# Patient Record
Sex: MALE | Race: WHITE | NOT HISPANIC OR LATINO | Employment: FULL TIME | URBAN - METROPOLITAN AREA
[De-identification: names, ages, dates, MRNs, and addresses within clinical notes are randomized per-mention and may not be internally consistent; named-entity substitution may affect disease eponyms.]

---

## 2020-10-23 ENCOUNTER — OFFICE VISIT (OUTPATIENT)
Dept: FAMILY MEDICINE | Facility: CLINIC | Age: 25
End: 2020-10-23
Payer: COMMERCIAL

## 2020-10-23 VITALS
BODY MASS INDEX: 23.95 KG/M2 | HEART RATE: 68 BPM | HEIGHT: 66 IN | TEMPERATURE: 98.6 F | DIASTOLIC BLOOD PRESSURE: 72 MMHG | SYSTOLIC BLOOD PRESSURE: 136 MMHG | WEIGHT: 149 LBS | OXYGEN SATURATION: 100 %

## 2020-10-23 DIAGNOSIS — R19.00 ABDOMINAL WALL BULGE: Primary | ICD-10-CM

## 2020-10-23 PROCEDURE — 99203 OFFICE O/P NEW LOW 30 MIN: CPT | Performed by: PHYSICIAN ASSISTANT

## 2020-10-23 ASSESSMENT — MIFFLIN-ST. JEOR: SCORE: 1600.67

## 2020-10-23 ASSESSMENT — ENCOUNTER SYMPTOMS
SHORTNESS OF BREATH: 0
ABDOMINAL PAIN: 0
NERVOUS/ANXIOUS: 0
VOMITING: 0
DIARRHEA: 0
FEVER: 0
LIGHT-HEADEDNESS: 0

## 2020-10-23 NOTE — PATIENT INSTRUCTIONS
Good news, the area of concern is actually a muscle in your abdominal wall and does not a hernia.  Please call the number on your paperwork to schedule an appointment for your yearly physical with one of our primary care doctors.  Please reach out with any questions or concerns.

## 2020-10-23 NOTE — PROGRESS NOTES
"Subjective     Geovany Capone is a 25 year old male who presents to clinic today for the following health issues:    HPI            Chief Complaint   Patient presents with     Mass     2.5 week noticed budlge in upper groin/lower abdomin area.      Patient noticed a bulge in the RLQ/groin that is only noticeable with flexing abdominal muscles. No specific injury or event that started symptoms. No pain.  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    Patient notes hernias as a child that were fixed surgically.       Review of Systems   Constitutional: Negative for fever.   HENT: Negative for congestion.    Respiratory: Negative for shortness of breath.    Cardiovascular: Negative for chest pain.   Gastrointestinal: Negative for abdominal pain, diarrhea and vomiting.   Skin: Negative for rash.        RLQ/Groin bulge   Neurological: Negative for light-headedness.   Psychiatric/Behavioral: The patient is not nervous/anxious.             Objective    /72   Pulse 68   Temp 98.6  F (37  C) (Tympanic)   Ht 1.664 m (5' 5.5\")   Wt 67.6 kg (149 lb)   SpO2 100%   BMI 24.42 kg/m    Body mass index is 24.42 kg/m .  Physical Exam  Vitals signs and nursing note reviewed.   Constitutional:       General: He is not in acute distress.     Appearance: Normal appearance.   HENT:      Head: Normocephalic and atraumatic.   Eyes:      Extraocular Movements: Extraocular movements intact.      Pupils: Pupils are equal, round, and reactive to light.   Neck:      Musculoskeletal: Normal range of motion.   Cardiovascular:      Rate and Rhythm: Normal rate and regular rhythm.      Heart sounds: Normal heart sounds.   Pulmonary:      Effort: Pulmonary effort is normal.      Breath sounds: Normal breath sounds.   Abdominal:      General: Bowel sounds are normal. There is no distension.      Palpations: Abdomen is soft. There is no mass.      Tenderness: There is no abdominal tenderness. There is no guarding or " rebound.      Comments: Area of concern is ridge formed by external oblique musculature   Musculoskeletal: Normal range of motion.   Skin:     General: Skin is warm and dry.   Neurological:      General: No focal deficit present.      Mental Status: He is alert.   Psychiatric:         Mood and Affect: Mood normal.         Behavior: Behavior normal.                  Assessment & Plan     Abdominal wall bulge  Patient is a 25-year-old male who presents to clinic due to concerns for right lower quadrant abdominal wall bulging.  Vital signs normal.  Physical exam without acute abnormalities.  Area of concern is ridge formed by external oblique musculature.  There is no palpable mass or tenderness to palpation to suggest hernia or acute abdomen.     Recommended establishing primary care for annual exams.  Patient agreeable.          See Patient Instructions    Return in about 2 weeks (around 11/6/2020) for Physical Exam.    Sheela Desir PA-C  [facility]

## 2021-08-02 NOTE — PROGRESS NOTES
"    Assessment & Plan     Molluscum contagiosum  Of suprapubic region.  Please see procedure note.   - DESTRUCT BENIGN LESION, UP TO 14     See Patient Instructions    Return in about 1 week (around 8/10/2021), or if symptoms worsen or fail to improve.    Lion Ware DO  [facility]    There are no problems to display for this patient.        Annmarie Armenta is a 25 year old who presents for the following health issues   *declines Tdap    HPI     Red, lump in groin area x3.5 months.     1. Bumps on groin region: Ongoing for the past 2 months.  On pubic region.  Patient does shave.  Not painful.  Not itchy.  No suspicious lesions on penis or scrotum.  Patient denies any unprotected sex but does admit to have oral sex.     Review of Systems   Constitutional: Negative for chills and fever.   HENT: Negative for congestion, ear pain, hearing loss and sore throat.    Eyes: Negative for pain and visual disturbance.   Respiratory: Negative for cough and shortness of breath.    Cardiovascular: Negative for chest pain, palpitations and peripheral edema.   Gastrointestinal: Negative for abdominal pain, constipation, diarrhea, heartburn, hematochezia and nausea.   Genitourinary: Negative for discharge, dysuria, frequency, genital sores, hematuria, impotence and urgency.   Musculoskeletal: Negative for arthralgias, joint swelling and myalgias.   Skin: Negative for rash.        Bumps on pubic region   Neurological: Negative for dizziness, weakness, headaches and paresthesias.   Psychiatric/Behavioral: Negative for mood changes. The patient is not nervous/anxious.          Objective    /73 (BP Location: Right arm, Cuff Size: Adult Large)   Pulse 71   Temp 98.6  F (37  C) (Tympanic)   Ht 1.803 m (5' 11\")   Wt 69.1 kg (152 lb 6.4 oz)   SpO2 100%   BMI 21.26 kg/m    Body mass index is 21.26 kg/m .  Physical Exam  Constitutional:       General: He is not in acute distress.     Appearance: He " is well-developed.   HENT:      Head: Normocephalic and atraumatic.      Nose: Nose normal.   Eyes:      Conjunctiva/sclera: Conjunctivae normal.   Neck:      Trachea: No tracheal deviation.   Cardiovascular:      Rate and Rhythm: Normal rate and regular rhythm.      Heart sounds: Normal heart sounds.   Pulmonary:      Effort: Pulmonary effort is normal.      Breath sounds: No wheezing.   Genitourinary:     Comments: Total of three slightly raised pedunculated papules consistent with papilloma.   Musculoskeletal:         General: Normal range of motion.      Cervical back: Normal range of motion.   Skin:     Findings: No erythema or rash.   Neurological:      Mental Status: He is alert and oriented to person, place, and time.   Psychiatric:         Behavior: Behavior normal.     Risk and benefits were explained to patient.  Patient would like to proceed.  Liquid nitrogen was applied to papilloma on pubic region with a total of 3 treatments.

## 2021-08-03 ENCOUNTER — OFFICE VISIT (OUTPATIENT)
Dept: FAMILY MEDICINE | Facility: CLINIC | Age: 26
End: 2021-08-03
Payer: COMMERCIAL

## 2021-08-03 VITALS
HEIGHT: 71 IN | HEART RATE: 71 BPM | WEIGHT: 152.4 LBS | OXYGEN SATURATION: 100 % | TEMPERATURE: 98.6 F | DIASTOLIC BLOOD PRESSURE: 73 MMHG | BODY MASS INDEX: 21.34 KG/M2 | SYSTOLIC BLOOD PRESSURE: 120 MMHG

## 2021-08-03 DIAGNOSIS — B08.1 MOLLUSCUM CONTAGIOSUM: Primary | ICD-10-CM

## 2021-08-03 PROCEDURE — 17110 DESTRUCTION B9 LES UP TO 14: CPT | Performed by: FAMILY MEDICINE

## 2021-08-03 ASSESSMENT — MIFFLIN-ST. JEOR: SCORE: 1703.41

## 2021-08-03 NOTE — PATIENT INSTRUCTIONS
Herman Armenta,    Thank you for allowing [facility] to manage your care.    If you have any questions or concerns, please feel free to call us at [phone].    Sincerely,    Dr. Ware    Did you know?      You can schedule a video visit for follow-up appointments as well as future appointments for certain conditions.  Please see the below link.     [email or web address]    If you have not already done so,  I encourage you to sign up for VoltDBhart ([email or web address]).  This will allow you to review your results, securely communicate with a provider, and schedule virtual visits as well.        Patient Education     Molluscum Contagiosum (Adult)  Molluscum contagiosum is a common skin infection. It is caused by a pox virus. The infection results in raised, flesh-colored bumps with central indentations on the skin. The bumps are sometimes itchy, but not painful. They may spread or form lines when scratched. Almost any area of skin can be affected. Common sites include the face, neck, armpit, arms, hands, and genitals.    Molluscum contagiosum spreads easily from one part of the body to another. It spreads through scratching or other contact. It can also spread from person to person. This often happens through shared clothing, towels, or objects such as shared sports gear. It can spread during contact sports or sexual contact.  Because it is caused by a virus, antibiotics don't help. The infection usually goes away on its own within a period of 6 to 18 months, but may spread if not treated. The infection may continue in people with a weak immune system. This includes people with diabetes, cancer, or HIV.  If the bumps are bothersome or unsightly, treatment may remove them. This may include scraping, freezing, or by applying an acid, blistering solution, or a cream that modulates the immune system.  Home care  Your healthcare provider can prescribe a medicine  to help the bumps heal. Follow the provider s instructions for using these medicines.    The following are general care guidelines:    Don't scratch the rash. Scratching spreads the infection. If needed, cover affected skin with bandages to help prevent scratching.    Wash your hands before and after caring for the rash.    Don't share towels, washcloths, or clothing with anyone.    Don't shave any areas where the bumps are present.    Don't have sex if the bumps are in the genital area.    If participating in contact sports or other activity that involves skin-to-skin contact, cover all affected skin with clothing or bandages.    Don't swim in public pools until the rash clears.  Follow-up care  Follow up with your healthcare provider, or as advised.  When to seek medical advice  Call your healthcare provider right away if any of these occur:    Fever of 100.4 F (38 C) or higher    Signs of infection, such as warmth, pain, oozing, or redness    Bumps appear on a new area of the body or seem to be spreading rapidly  StayWell last reviewed this educational content on 6/1/2018 2000-2021 [employer]. All rights reserved. This information is not intended as a substitute for professional medical care. Always follow your healthcare professional's instructions.

## 2021-08-04 ASSESSMENT — ENCOUNTER SYMPTOMS
DIZZINESS: 0
EYE PAIN: 0
DYSURIA: 0
FREQUENCY: 0
PALPITATIONS: 0
CHILLS: 0
SHORTNESS OF BREATH: 0
SORE THROAT: 0
ABDOMINAL PAIN: 0
WEAKNESS: 0
FEVER: 0
ARTHRALGIAS: 0
DIARRHEA: 0
HEADACHES: 0
NAUSEA: 0
NERVOUS/ANXIOUS: 0
HEMATURIA: 0
HEMATOCHEZIA: 0
COUGH: 0
CONSTIPATION: 0
JOINT SWELLING: 0
PARESTHESIAS: 0
MYALGIAS: 0
HEARTBURN: 0

## 2021-10-25 NOTE — PROGRESS NOTES
"  Assessment & Plan     1. Other viral warts  Please see procedure note.   - REVIEW OF HEALTH MAINTENANCE PROTOCOL ORDERS  - DESTRUCT BENIGN LESION, UP TO 14     See Patient Instructions    Return in about 6 months (around 4/26/2022), or if symptoms worsen or fail to improve, for with me, in person, Physical Exam.    DO ROBERTO Doe [facility] RADHA Armenta is a 26 year old who presents for the following health issues     Declines flu and Tdap vaccine    HPI     Molluscum groin area    1. Molluscum f/u: States that symptoms has improved.  However, there is one more lesion on testicle.  Patient is interested in liquid nitrogen.       Review of Systems   Constitutional: Negative for chills and fever.   HENT: Negative for congestion, ear pain, hearing loss and sore throat.    Eyes: Negative for pain and visual disturbance.   Respiratory: Negative for cough and shortness of breath.    Cardiovascular: Negative for chest pain, palpitations and peripheral edema.   Gastrointestinal: Negative for abdominal pain, constipation, diarrhea, heartburn, hematochezia and nausea.   Genitourinary: Negative for discharge, dysuria, frequency, genital sores, hematuria, impotence and urgency.   Musculoskeletal: Negative for arthralgias, joint swelling and myalgias.   Skin: Negative for rash.        Wart on left testicle   Neurological: Negative for dizziness, weakness, headaches and paresthesias.   Psychiatric/Behavioral: Negative for mood changes. The patient is not nervous/anxious.          Objective    /75 (BP Location: Right arm, Cuff Size: Adult Regular)   Pulse 65   Temp 97.5  F (36.4  C) (Tympanic)   Ht 1.803 m (5' 11\")   Wt 68.9 kg (152 lb)   SpO2 100%   BMI 21.20 kg/m    Body mass index is 21.2 kg/m .  Physical Exam  Constitutional:       General: He is not in acute distress.     Appearance: He is well-developed.   HENT:      Head: Normocephalic and atraumatic.      Nose: Nose " normal.   Eyes:      Conjunctiva/sclera: Conjunctivae normal.   Neck:      Trachea: No tracheal deviation.   Cardiovascular:      Rate and Rhythm: Normal rate and regular rhythm.      Heart sounds: Normal heart sounds.   Pulmonary:      Effort: Pulmonary effort is normal.      Breath sounds: No wheezing.   Musculoskeletal:         General: Normal range of motion.      Cervical back: Normal range of motion.   Skin:     Findings: No erythema or rash.      Comments: Small verrucous lesion involving left upper testicle   Neurological:      Mental Status: He is alert and oriented to person, place, and time.   Psychiatric:         Behavior: Behavior normal.          Risk and benefits were explained to patient.  Patient would like to proceed.  Liquid nitrogen was applied to wart with a total of 3 treatments.

## 2021-10-26 ENCOUNTER — OFFICE VISIT (OUTPATIENT)
Dept: FAMILY MEDICINE | Facility: CLINIC | Age: 26
End: 2021-10-26
Payer: COMMERCIAL

## 2021-10-26 VITALS
OXYGEN SATURATION: 100 % | HEIGHT: 71 IN | DIASTOLIC BLOOD PRESSURE: 75 MMHG | BODY MASS INDEX: 21.28 KG/M2 | SYSTOLIC BLOOD PRESSURE: 120 MMHG | TEMPERATURE: 97.5 F | WEIGHT: 152 LBS | HEART RATE: 65 BPM

## 2021-10-26 DIAGNOSIS — B07.8 OTHER VIRAL WARTS: Primary | ICD-10-CM

## 2021-10-26 PROCEDURE — 17110 DESTRUCTION B9 LES UP TO 14: CPT | Performed by: FAMILY MEDICINE

## 2021-10-26 ASSESSMENT — ENCOUNTER SYMPTOMS
FEVER: 0
ARTHRALGIAS: 0
DYSURIA: 0
FREQUENCY: 0
PALPITATIONS: 0
ABDOMINAL PAIN: 0
CONSTIPATION: 0
HEADACHES: 0
CHILLS: 0
JOINT SWELLING: 0
EYE PAIN: 0
SHORTNESS OF BREATH: 0
SORE THROAT: 0
HEMATURIA: 0
MYALGIAS: 0
NERVOUS/ANXIOUS: 0
COUGH: 0
PARESTHESIAS: 0
WEAKNESS: 0
NAUSEA: 0
HEARTBURN: 0
DIARRHEA: 0
HEMATOCHEZIA: 0
DIZZINESS: 0

## 2021-10-26 ASSESSMENT — PAIN SCALES - GENERAL: PAINLEVEL: NO PAIN (0)

## 2021-10-26 ASSESSMENT — MIFFLIN-ST. JEOR: SCORE: 1696.6

## 2021-10-26 NOTE — PATIENT INSTRUCTIONS
Anshu Armenta,    Thank you for allowing [facility] to manage your care.    If you have any questions or concerns, please feel free to call us at [phone].    Sincerely,    Dr. Ware    Did you know?      You can schedule a video visit for follow-up appointments as well as future appointments for certain conditions.  Please see the below link.     [email or web address]    If you have not already done so,  I encourage you to sign up for A LITTLE WORLDt ([email or web address]).  This will allow you to review your results, securely communicate with a provider, and schedule virtual visits as well.

## 2023-02-16 ENCOUNTER — OFFICE VISIT (OUTPATIENT)
Dept: FAMILY MEDICINE | Facility: CLINIC | Age: 28
End: 2023-02-16
Payer: COMMERCIAL

## 2023-02-16 VITALS
WEIGHT: 152.6 LBS | SYSTOLIC BLOOD PRESSURE: 130 MMHG | BODY MASS INDEX: 20.22 KG/M2 | TEMPERATURE: 99.5 F | RESPIRATION RATE: 16 BRPM | DIASTOLIC BLOOD PRESSURE: 64 MMHG | HEART RATE: 71 BPM | OXYGEN SATURATION: 99 % | HEIGHT: 73 IN

## 2023-02-16 DIAGNOSIS — Z71.1 FEARED COMPLAINT WITHOUT DIAGNOSIS: Primary | ICD-10-CM

## 2023-02-16 DIAGNOSIS — Z11.3 SCREEN FOR STD (SEXUALLY TRANSMITTED DISEASE): ICD-10-CM

## 2023-02-16 PROCEDURE — 87591 N.GONORRHOEAE DNA AMP PROB: CPT | Performed by: FAMILY MEDICINE

## 2023-02-16 PROCEDURE — 99213 OFFICE O/P EST LOW 20 MIN: CPT | Performed by: FAMILY MEDICINE

## 2023-02-16 PROCEDURE — 87491 CHLMYD TRACH DNA AMP PROBE: CPT | Performed by: FAMILY MEDICINE

## 2023-02-16 ASSESSMENT — PAIN SCALES - GENERAL: PAINLEVEL: NO PAIN (0)

## 2023-02-16 NOTE — PROGRESS NOTES
1. Feared complaint without diagnosis  Patient reassurance.  No evidence of penile or scrotal mass.     2. Screen for STD (sexually transmitted disease)  Stressed the importance of safe sexual pratices  - NEISSERIA GONORRHOEA PCR; Future  - CHLAMYDIA TRACHOMATIS PCR; Future  - CHLAMYDIA TRACHOMATIS PCR  - NEISSERIA GONORRHOEA PCR      Annmarie Armenta is a 27 year old, presenting for the following health issues:  Consult    Patient states he has had a lump in his groin x7 months, no pain.     History of Present Illness       Reason for visit:  Std?  Symptom onset:  More than a month  Symptoms include:  Looks like chlamydia? no itchiness or burning  Symptom intensity:  Mild  Symptom progression:  Staying the same  Had these symptoms before:  No  What makes it worse:  No  What makes it better:  Nothing specifically    He eats 0-1 servings of fruits and vegetables daily.He consumes 1 sweetened beverage(s) daily.He exercises with enough effort to increase his heart rate 60 or more minutes per day.  He exercises with enough effort to increase his heart rate 5 days per week.   He is taking medications regularly.     1. STI concerns: Concerned of chlamydia.  Looks like a blob.  Noticed on scrotum.  Noticed 7 months.  No pain.  No changes in size.  States of unprotected sex.  Has not had unprotected sex since 2021.     Review of Systems   Constitutional: Negative for chills and fever.   HENT: Negative for congestion, ear pain, hearing loss and sore throat.    Respiratory: Negative for cough and shortness of breath.    Cardiovascular: Negative for chest pain, palpitations and peripheral edema.   Genitourinary:        States of scrotum lesion   Musculoskeletal: Negative for arthralgias, joint swelling and myalgias.   Skin: Negative for rash.   Neurological: Negative for dizziness, weakness, headaches and paresthesias.   Psychiatric/Behavioral: Negative for mood changes. The patient is not nervous/anxious.            "  Objective    /64   Pulse 71   Temp 99.5  F (37.5  C) (Temporal)   Resp 16   Ht 1.846 m (6' 0.68\")   Wt 69.2 kg (152 lb 9.6 oz)   SpO2 99%   BMI 20.31 kg/m    Body mass index is 20.31 kg/m .  Physical Exam  Constitutional:       General: He is not in acute distress.     Appearance: He is well-developed.   HENT:      Head: Normocephalic and atraumatic.      Nose: Nose normal.   Eyes:      Conjunctiva/sclera: Conjunctivae normal.   Neck:      Trachea: No tracheal deviation.   Cardiovascular:      Rate and Rhythm: Normal rate and regular rhythm.      Heart sounds: Normal heart sounds.   Pulmonary:      Effort: Pulmonary effort is normal.      Breath sounds: No wheezing.   Genitourinary:     Comments: Normal scrotum and penis.  Negative for any suspicious lesoins  Musculoskeletal:         General: Normal range of motion.      Cervical back: Normal range of motion.   Skin:     Findings: No erythema or rash.   Neurological:      Mental Status: He is alert and oriented to person, place, and time.   Psychiatric:         Behavior: Behavior normal.           "

## 2023-02-16 NOTE — LETTER
February 16, 2023      Geovany Capone  [address]  [place] [address]        To Whom It May Concern:    Geovany Capone was seen in our clinic. Please excuse from work this afternoon due to a doctor's appointment.      Sincerely,      Dr. Lion Ware

## 2023-02-16 NOTE — PATIENT INSTRUCTIONS
Anshu Armenta,    Thank you for allowing [facility] to manage your care.    I ordered a urine test, please go to the laboratory to get your laboratory studies.    For your convenience, test results are released as soon as they are available  Please allow 1-2 business days for me to send you a comment about your results.  If not done so, I encourage you to login into [email or web address] to review your results in real time.     If you have any questions or concerns, please feel free to call us at [phone].    Sincerely,    Dr. Ware    Did you know?      You can schedule a video visit for follow-up appointments as well as future appointments for certain conditions.  Please see the below link.     [email or web address]    If you have not already done so,  I encourage you to sign up for Public Solutiont ([email or web address]).  This will allow you to review your results, securely communicate with a provider, and schedule virtual visits as well.

## 2023-02-17 LAB
C TRACH DNA SPEC QL NAA+PROBE: NEGATIVE
N GONORRHOEA DNA SPEC QL NAA+PROBE: NEGATIVE

## 2023-02-17 ASSESSMENT — ENCOUNTER SYMPTOMS
PARESTHESIAS: 0
PALPITATIONS: 0
MYALGIAS: 0
COUGH: 0
HEADACHES: 0
SORE THROAT: 0
JOINT SWELLING: 0
NERVOUS/ANXIOUS: 0
ARTHRALGIAS: 0
SHORTNESS OF BREATH: 0
CHILLS: 0
FEVER: 0
WEAKNESS: 0
DIZZINESS: 0

## 2023-07-28 ENCOUNTER — ANCILLARY PROCEDURE (OUTPATIENT)
Dept: GENERAL RADIOLOGY | Facility: CLINIC | Age: 28
End: 2023-07-28
Attending: PHYSICIAN ASSISTANT
Payer: COMMERCIAL

## 2023-07-28 ENCOUNTER — OFFICE VISIT (OUTPATIENT)
Dept: URGENT CARE | Facility: URGENT CARE | Age: 28
End: 2023-07-28

## 2023-07-28 VITALS
WEIGHT: 151.8 LBS | OXYGEN SATURATION: 100 % | DIASTOLIC BLOOD PRESSURE: 73 MMHG | HEART RATE: 61 BPM | TEMPERATURE: 98.6 F | RESPIRATION RATE: 12 BRPM | SYSTOLIC BLOOD PRESSURE: 123 MMHG | BODY MASS INDEX: 20.21 KG/M2

## 2023-07-28 DIAGNOSIS — M25.512 ACUTE PAIN OF LEFT SHOULDER: ICD-10-CM
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
## 2023-07-28 DIAGNOSIS — S42.022A DISPLACED FRACTURE OF SHAFT OF LEFT CLAVICLE, INITIAL ENCOUNTER FOR CLOSED FRACTURE: Primary | ICD-10-CM

## 2023-07-28 PROCEDURE — 99213 OFFICE O/P EST LOW 20 MIN: CPT | Performed by: PHYSICIAN ASSISTANT

## 2023-07-28 PROCEDURE — 73030 X-RAY EXAM OF SHOULDER: CPT | Mod: TC | Performed by: RADIOLOGY

## 2023-07-28 PROCEDURE — 73000 X-RAY EXAM OF COLLAR BONE: CPT | Mod: TC | Performed by: RADIOLOGY
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
## 2023-07-31 NOTE — TELEPHONE ENCOUNTER
DIAGNOSIS: clavicle fracture   APPOINTMENT DATE: 08/04/2023   NOTES STATUS DETAILS   OFFICE NOTE from referring provider Internal 07/28/2023 Dr Daigle [facility] urgent care   OFFICE NOTE from other specialist N/A    DISCHARGE SUMMARY from hospital N/A    DISCHARGE REPORT from the ER N/A    OPERATIVE REPORT N/A    EMG report N/A    MEDICATION LIST N/A    MRI N/A    DEXA (osteoporosis/bone health) N/A    CT SCAN N/A    XRAYS (IMAGES & REPORTS) Internal 07/28/2023 [facility] shoulder, clavicle

## 2023-08-04 ENCOUNTER — OFFICE VISIT (OUTPATIENT)
Dept: ORTHOPEDICS | Facility: CLINIC | Age: 28
End: 2023-08-04
Payer: COMMERCIAL

## 2023-08-04 ENCOUNTER — ANCILLARY PROCEDURE (OUTPATIENT)
Dept: GENERAL RADIOLOGY | Facility: CLINIC | Age: 28
End: 2023-08-04
Attending: FAMILY MEDICINE
Payer: COMMERCIAL

## 2023-08-04 ENCOUNTER — PRE VISIT (OUTPATIENT)
Dept: ORTHOPEDICS | Facility: CLINIC | Age: 28
End: 2023-08-04

## 2023-08-04 DIAGNOSIS — S42.022A DISPLACED FRACTURE OF SHAFT OF LEFT CLAVICLE, INITIAL ENCOUNTER FOR CLOSED FRACTURE: Primary | ICD-10-CM

## 2023-08-04 DIAGNOSIS — M25.512 ACUTE PAIN OF LEFT SHOULDER: ICD-10-CM
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
## 2023-08-04 DIAGNOSIS — S42.022A DISPLACED FRACTURE OF SHAFT OF LEFT CLAVICLE, INITIAL ENCOUNTER FOR CLOSED FRACTURE: ICD-10-CM

## 2023-08-04 PROCEDURE — 99203 OFFICE O/P NEW LOW 30 MIN: CPT | Performed by: FAMILY MEDICINE

## 2023-08-04 PROCEDURE — 73000 X-RAY EXAM OF COLLAR BONE: CPT | Mod: LT | Performed by: RADIOLOGY

## 2023-08-04 ASSESSMENT — PAIN SCALES - GENERAL: PAINLEVEL: NO PAIN (0)
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
## 2023-08-04 NOTE — NURSING NOTE
Chief Complaint   Patient presents with    Left Shoulder - Pain, New Patient       There were no vitals filed for this visit.    There is no height or weight on file to calculate BMI.      TITI Mclain NREMT
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
## 2023-08-25 ENCOUNTER — ANCILLARY PROCEDURE (OUTPATIENT)
Dept: GENERAL RADIOLOGY | Facility: CLINIC | Age: 28
End: 2023-08-25
Attending: FAMILY MEDICINE

## 2023-08-25 ENCOUNTER — OFFICE VISIT (OUTPATIENT)
Dept: ORTHOPEDICS | Facility: CLINIC | Age: 28
End: 2023-08-25

## 2023-08-25 DIAGNOSIS — S42.022A DISPLACED FRACTURE OF SHAFT OF LEFT CLAVICLE, INITIAL ENCOUNTER FOR CLOSED FRACTURE: ICD-10-CM

## 2023-08-25 DIAGNOSIS — S42.022A DISPLACED FRACTURE OF SHAFT OF LEFT CLAVICLE, INITIAL ENCOUNTER FOR CLOSED FRACTURE: Primary | ICD-10-CM

## 2023-08-25 PROCEDURE — 99214 OFFICE O/P EST MOD 30 MIN: CPT | Performed by: FAMILY MEDICINE

## 2023-08-25 PROCEDURE — 73000 X-RAY EXAM OF COLLAR BONE: CPT | Mod: LT | Performed by: RADIOLOGY

## 2023-08-25 NOTE — PROGRESS NOTES
HISTORY OF PRESENT ILLNESS  Mr. Capone is a pleasant 28 year old male following up with a left clavicle fracture.  Geovany has been doing great over the past few weeks.  His pain is nearly gone.  He is able to move his shoulder and ranges of motion that are not painful.     PHYSICAL EXAM  General  - normal appearance, in no obvious distress  Musculoskeletal - left shoulder  - inspection: normal bone and joint alignment  - palpation: no bony or soft tissue tenderness  - ROM:  FROM, mildly painful end range flexion  Neuro  - no sensory or motor deficit, grossly normal coordination, normal muscle tone          ASSESSMENT & PLAN  Mr. Capone is a 28 year old male following up with a left clavicle fracture.    I ordered & independently reviewed an xray of his left clavicle, this redemonstrates his fracture, there is evidence of healing to this point.    He is currently 4 weeks out from his fracture, I do think it is reasonable to wean out of his sling over the course of the next 2 weeks.  We discussed how to do this in some detail.    If he does well we can follow-up as needed for this and other issues.    It was a pleasure seeing Geovany.        Mateo Lara DO, [facility]      This note was constructed using Dragon dictation software, please excuse any minor errors in spelling, grammar, or syntax.

## 2023-08-25 NOTE — LETTER
8/25/2023         RE: Geovany Capone  [address]  [place] [address]        Dear Colleague,    Thank you for referring your patient, Geovany Capone, to the [facility] [place]. Please see a copy of my visit note below.    HISTORY OF PRESENT ILLNESS  Mr. Capone is a pleasant 28 year old male following up with a left clavicle fracture.  Geovany has been doing great over the past few weeks.  His pain is nearly gone.  He is able to move his shoulder and ranges of motion that are not painful.     PHYSICAL EXAM  General  - normal appearance, in no obvious distress  Musculoskeletal - left shoulder  - inspection: normal bone and joint alignment  - palpation: no bony or soft tissue tenderness  - ROM:  FROM, mildly painful end range flexion  Neuro  - no sensory or motor deficit, grossly normal coordination, normal muscle tone          ASSESSMENT & PLAN  Mr. Capone is a 28 year old male following up with a left clavicle fracture.    I ordered & independently reviewed an xray of his left clavicle, this redemonstrates his fracture, there is evidence of healing to this point.    He is currently 4 weeks out from his fracture, I do think it is reasonable to wean out of his sling over the course of the next 2 weeks.  We discussed how to do this in some detail.    If he does well we can follow-up as needed for this and other issues.    It was a pleasure seeing Geovany.        Mateo Lara DO, CAQSM      This note was constructed using Dragon dictation software, please excuse any minor errors in spelling, grammar, or syntax.    Again, thank you for allowing me to participate in the care of your patient.        Sincerely,        Mateo Lara DO

## 2023-08-25 NOTE — LETTER
August 25, 2023      Geovany Capone  [address]  [place] [address]        To Whom It May Concern:    Geovany Capone was seen in our clinic. He may return to work with the following restrictions: No overhead work with left shoulder.  No push, pull, drag, carry over 20 pounds.     Restrictions to be in place for 2 weeks. After 2 weeks, patient may work without restrictions.       Sincerely,        Mateo Lara, DO

## 2023-08-29 ENCOUNTER — TELEPHONE (OUTPATIENT)
Dept: FAMILY MEDICINE | Facility: CLINIC | Age: 28
End: 2023-08-29

## 2023-08-29 NOTE — TELEPHONE ENCOUNTER
Forms received from: [employer]   Phone number listed: [phone]   Fax listed: [phone]  Date received: 8/28/23  Form description: Disability paperwork  Once forms are completed, please return to [employer] via fax.  Is patient requesting to be contacted when forms are completed: na  Phone: na  Form placed:  Dr. Jeanie Abdalla

## 2023-09-08 ENCOUNTER — TELEPHONE (OUTPATIENT)
Dept: FAMILY MEDICINE | Facility: CLINIC | Age: 28
End: 2023-09-08

## 2023-09-08 NOTE — TELEPHONE ENCOUNTER
Forms received from: [employer]   Phone number listed: [phone]   Fax listed: [phone]  Date received: 9/4/23  Form description: Disability paperwork  Once forms are completed, please return to [employer] via fax.  Is patient requesting to be contacted when forms are completed: na  Phone: na  Form placed: Dr. Jeanie Abdalla
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
## 2023-09-15 NOTE — TELEPHONE ENCOUNTER
Patient returned call.  Dr. Mateo Lara, who he saw at [place] will fill out the paperwork. Please fax to this provider (did not have fax number).  Call patient if any questions.  Poonam Butler,